# Patient Record
Sex: MALE | Race: WHITE | Employment: FULL TIME | URBAN - METROPOLITAN AREA
[De-identification: names, ages, dates, MRNs, and addresses within clinical notes are randomized per-mention and may not be internally consistent; named-entity substitution may affect disease eponyms.]

---

## 2023-10-23 ENCOUNTER — IMMUNIZATION (OUTPATIENT)
Age: 43
End: 2023-10-23

## 2023-10-23 DIAGNOSIS — Z23 ENCOUNTER FOR IMMUNIZATION: Primary | ICD-10-CM

## 2023-10-23 NOTE — PROGRESS NOTES
Patient requests flu vaccine; consent form obtained; denies fever, egg allergy nor past reactions to any injection or immunization. Immunization given per protocol and recorded in [address]. VIS information sheet given, explained possible S/E. Reviewed sx indicating need to be seen in ER. Pt had no adverse reaction at time of discharge.  Flu shot given by Mel MULTANI on 10/22/23  Tasha Branham RN